# Patient Record
Sex: FEMALE | Race: WHITE
[De-identification: names, ages, dates, MRNs, and addresses within clinical notes are randomized per-mention and may not be internally consistent; named-entity substitution may affect disease eponyms.]

---

## 2021-01-19 ENCOUNTER — HOSPITAL ENCOUNTER (EMERGENCY)
Dept: HOSPITAL 53 - M ED | Age: 26
Discharge: HOME | End: 2021-01-19
Payer: COMMERCIAL

## 2021-01-19 VITALS — HEIGHT: 69 IN | WEIGHT: 184.97 LBS | BODY MASS INDEX: 27.4 KG/M2

## 2021-01-19 VITALS — SYSTOLIC BLOOD PRESSURE: 110 MMHG | DIASTOLIC BLOOD PRESSURE: 58 MMHG

## 2021-01-19 DIAGNOSIS — Z88.5: ICD-10-CM
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
## 2021-01-19 DIAGNOSIS — S06.0X0A: ICD-10-CM
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
## 2021-01-19 DIAGNOSIS — S00.83XA: Primary | ICD-10-CM

## 2021-01-19 NOTE — REP
INDICATION:

fall.



COMPARISON:

None.



TECHNIQUE:

Helical scanning is acquired and overlapping 2 mm high resolution axial images were

generated and reviewed at bone and soft tissue window settings.  Coronal and sagittal

multiplanar re-formations images are generated.



FINDINGS:

There is no evidence of cervical spine element fracture.  No skull base fracture is

seen.  Cervical vertebral body heights are preserved.  Alignment is normal.  Facet

joints are normally aligned bilaterally at each cervical level on multiplanar

re-formations images.  There is no evidence of intraspinal or paraspinal hematoma.  No

extra vertebral abnormality is seen.





IMPRESSION:

Negative CT study of the cervical spine without contrast.  No fracture seen.





<Electronically signed by Alexander Medina > 01/19/21 [id number]

## 2021-01-19 NOTE — REP
INDICATION:

fall.



COMPARISON:

None.



TECHNIQUE:

Helical scanning is acquired. 5 mm axial images were reformatted.  Coronal MPR images

were generated.



FINDINGS:

Bone window settings demonstrate an intact bony calvarium.  There is no evidence of

skull fracture or incidental bony calvarial lesion.  There is mild mucosal thickening

involving the maxillary and ethmoid air cells bilaterally consistent with mild

sinusitis.  In addition, there are surgical fixation devices along the medial walls of

the maxillary sinuses bilaterally.   view demonstrates mandibular surgical

fixation plates bilaterally as well indicating previous surgery.



No intraorbital abnormality is seen.  On soft tissue window setting images; the

lateral, third, and fourth ventricles are normal in size and position.  Gray-white

differentiation pattern is normal above and below the tentorium.  There are is no

evidence of intracranial hemorrhage.  No mass, edema, infarction, or midline shift is

seen.  No extra-axial fluid collection is appreciated.





IMPRESSION:

Postsurgical changes in the mandible and maxilla as above.  Maxillary sinus and

ethmoid sinus mucosal thickening.  Otherwise normal noncontrast head CT.  No skull

fracture or acute intracranial abnormality..





<Electronically signed by Alexander Medina > 01/19/21 [id number]

## 2021-01-19 NOTE — REP
INDICATION:

fall.



COMPARISON:

NONE.



TECHNIQUE:

Helical scanning is acquired and 2 mm axial images re-formatted.  Coronal MPR images

are generated and reviewed.



FINDINGS:

There are mild mucosal changes affecting the ethmoid air cells and the maxillary

sinuses bilaterally.  Postsurgical changes with metallic fixation devices are seen

along the anterior walls of the maxillary sinuses bilaterally and there is a fusion

plate in each mandibular ramus.  No maxillary or mandibular deformity is appreciated.

No bony destructive lesion is seen.  Orbital margins are intact.  Nasal bone and

inferior may nasal spine are have an intact appearance.  No maxillary or mandibular

fracture is appreciated.  No orbital fracture is seen.



IMPRESSION:

Postsurgical changes as above in the mandible and maxilla.  Bilateral maxillary and

bilateral ethmoid sinus mucosal changes.  No acute fracture seen.





<Electronically signed by Alexander Medina > 01/19/21 [id number]

## 2021-01-25 ENCOUNTER — HOSPITAL ENCOUNTER (EMERGENCY)
Dept: HOSPITAL 53 - M ED | Age: 26
Discharge: HOME | End: 2021-01-25
Payer: COMMERCIAL

## 2021-01-25 VITALS — HEIGHT: 69 IN | WEIGHT: 183.65 LBS | BODY MASS INDEX: 27.2 KG/M2

## 2021-01-25 VITALS — SYSTOLIC BLOOD PRESSURE: 126 MMHG | DIASTOLIC BLOOD PRESSURE: 60 MMHG
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
## 2021-01-25 DIAGNOSIS — S04.30XA: ICD-10-CM

## 2021-01-25 DIAGNOSIS — Z88.5: ICD-10-CM

## 2021-01-25 DIAGNOSIS — S06.0X0A: Primary | ICD-10-CM
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
